# Patient Record
Sex: MALE | Race: WHITE | NOT HISPANIC OR LATINO | Employment: FULL TIME | URBAN - METROPOLITAN AREA
[De-identification: names, ages, dates, MRNs, and addresses within clinical notes are randomized per-mention and may not be internally consistent; named-entity substitution may affect disease eponyms.]

---

## 2019-12-09 ENCOUNTER — TRANSCRIBE ORDERS (OUTPATIENT)
Dept: ADMINISTRATIVE | Facility: HOSPITAL | Age: 54
End: 2019-12-09

## 2019-12-09 DIAGNOSIS — N45.1 EPIDIDYMITIS WITHOUT ABSCESS: Primary | ICD-10-CM

## 2019-12-10 ENCOUNTER — HOSPITAL ENCOUNTER (OUTPATIENT)
Dept: RADIOLOGY | Facility: HOSPITAL | Age: 54
Discharge: HOME | End: 2019-12-10
Attending: UROLOGY
Payer: COMMERCIAL

## 2019-12-10 DIAGNOSIS — N45.1 EPIDIDYMITIS WITHOUT ABSCESS: ICD-10-CM

## 2019-12-10 PROCEDURE — 76870 US EXAM SCROTUM: CPT

## 2020-03-02 ENCOUNTER — OCCMED (OUTPATIENT)
Dept: URGENT CARE | Facility: CLINIC | Age: 55
End: 2020-03-02
Payer: OTHER MISCELLANEOUS

## 2020-03-02 ENCOUNTER — APPOINTMENT (OUTPATIENT)
Dept: RADIOLOGY | Facility: CLINIC | Age: 55
End: 2020-03-02
Payer: OTHER MISCELLANEOUS

## 2020-03-02 DIAGNOSIS — S99.912A INJURY OF LEFT ANKLE, INITIAL ENCOUNTER: Primary | ICD-10-CM

## 2020-03-02 DIAGNOSIS — S99.912A INJURY OF LEFT ANKLE, INITIAL ENCOUNTER: ICD-10-CM

## 2020-03-02 PROCEDURE — G0382 LEV 3 HOSP TYPE B ED VISIT: HCPCS | Performed by: NURSE PRACTITIONER

## 2020-03-02 PROCEDURE — 99283 EMERGENCY DEPT VISIT LOW MDM: CPT | Performed by: NURSE PRACTITIONER

## 2020-03-02 PROCEDURE — 73610 X-RAY EXAM OF ANKLE: CPT

## 2020-03-04 ENCOUNTER — OCCMED (OUTPATIENT)
Dept: URGENT CARE | Facility: CLINIC | Age: 55
End: 2020-03-04
Payer: OTHER MISCELLANEOUS

## 2020-03-04 DIAGNOSIS — Z02.6 ENCOUNTER RELATED TO WORKER'S COMPENSATION CLAIM: Primary | ICD-10-CM

## 2020-03-04 PROCEDURE — 99213 OFFICE O/P EST LOW 20 MIN: CPT | Performed by: PHYSICIAN ASSISTANT

## 2020-07-28 DIAGNOSIS — S42.451A CLOSED FRACTURE OF CAPITELLUM OF DISTAL HUMERUS, RIGHT, INITIAL ENCOUNTER: Primary | ICD-10-CM

## 2020-07-28 NOTE — TELEPHONE ENCOUNTER
Same appt is fine, but i'm putting an order for a CT scan which he should get before his appointment with me on Thursday  Please coordinate with the patient to have this done stat

## 2020-07-28 NOTE — TELEPHONE ENCOUNTER
Patient dropped of disc to the office and is now uploaded in [address]  He would like you to review it and see if he should come in earlier than his Wednesday appt  Thank you

## 2020-07-28 NOTE — TELEPHONE ENCOUNTER
I scheduled the patient for the CT scan Wednesday, July 29, 2020 at 8:00 am at [facility]   I called to let him know as well and confirmed appt for Thursday

## 2020-07-29 ENCOUNTER — HOSPITAL ENCOUNTER (OUTPATIENT)
Dept: RADIOLOGY | Facility: HOSPITAL | Age: 55
Discharge: HOME | End: 2020-07-29
Attending: ORTHOPAEDIC SURGERY
Payer: COMMERCIAL

## 2020-07-29 ENCOUNTER — TRANSCRIBE ORDERS (OUTPATIENT)
Dept: RADIOLOGY | Facility: HOSPITAL | Age: 55
End: 2020-07-29

## 2020-07-29 DIAGNOSIS — S42.451A CLOSED FRACTURE OF CAPITELLUM OF DISTAL HUMERUS, RIGHT, INITIAL ENCOUNTER: ICD-10-CM

## 2020-07-29 PROCEDURE — 73200 CT UPPER EXTREMITY W/O DYE: CPT

## 2020-07-30 ENCOUNTER — OFFICE VISIT (OUTPATIENT)
Dept: OBGYN CLINIC | Facility: MEDICAL CENTER | Age: 55
End: 2020-07-30
Payer: COMMERCIAL

## 2020-07-30 ENCOUNTER — APPOINTMENT (OUTPATIENT)
Dept: RADIOLOGY | Facility: MEDICAL CENTER | Age: 55
End: 2020-07-30
Payer: COMMERCIAL

## 2020-07-30 ENCOUNTER — OFFICE VISIT (OUTPATIENT)
Dept: PHYSICAL THERAPY | Facility: MEDICAL CENTER | Age: 55
End: 2020-07-30
Payer: COMMERCIAL

## 2020-07-30 VITALS
HEIGHT: 72 IN | DIASTOLIC BLOOD PRESSURE: 76 MMHG | WEIGHT: 285 LBS | HEART RATE: 71 BPM | BODY MASS INDEX: 38.6 KG/M2 | SYSTOLIC BLOOD PRESSURE: 120 MMHG

## 2020-07-30 DIAGNOSIS — S42.451A CLOSED FRACTURE OF CAPITELLUM OF DISTAL HUMERUS, RIGHT, INITIAL ENCOUNTER: ICD-10-CM

## 2020-07-30 DIAGNOSIS — S42.451A CLOSED FRACTURE OF CAPITELLUM OF DISTAL HUMERUS, RIGHT, INITIAL ENCOUNTER: Primary | ICD-10-CM

## 2020-07-30 PROCEDURE — 73080 X-RAY EXAM OF ELBOW: CPT

## 2020-07-30 PROCEDURE — 99204 OFFICE O/P NEW MOD 45 MIN: CPT | Performed by: ORTHOPAEDIC SURGERY

## 2020-07-30 PROCEDURE — L3763 EWHO RIGID W/O JNTS CF: HCPCS | Performed by: PHYSICAL THERAPIST
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
## 2020-07-30 NOTE — PROGRESS NOTES
Orthosis    Diagnosis:   1  Closed fracture of capitellum of distal humerus, right, initial encounter  Ambulatory referral to PT/OT hand therapy     Indication: Fracture    Location: Right  elbow and wrist  Supplies: Custom Fit Orthotic and Skin coverage   Orthosis type: posterior long arm splint  Wearing Schedule: Remove for hygiene only  Describe Position: Elbow flexion to 90 degrees, splint extends from upper arm to wrist with wrist and forearm in neutral     Precautions: Fracture and Universal (skin contact/breakdown)    Patient or Caregiver expresses understanding of wearing Schedule and Precautions? Yes  Patient or Caregiver able to don/doff orthotic independently? Yes    Written orders provided to patient?  Yes  Orders Obtained: Written  Orders Obtained from: Dr Roshan Connor     Return for evaluation and treatment No

## 2020-07-30 NOTE — LETTER
July 30, 2020     Patient: Tristen Peres   YOB: 1965   Date of Visit: 7/30/2020       To Whom it May Concern:    Tristen Peres is under my professional care  He was seen in my office on 7/30/2020  He is to remain out of work until further notice  If you have any questions or concerns, please don't hesitate to call           Sincerely,          Elisabeth Wells MD        CC: No Recipients

## 2020-08-17 ENCOUNTER — APPOINTMENT (OUTPATIENT)
Dept: RADIOLOGY | Facility: MEDICAL CENTER | Age: 55
End: 2020-08-17
Payer: COMMERCIAL

## 2020-08-17 ENCOUNTER — OFFICE VISIT (OUTPATIENT)
Dept: OBGYN CLINIC | Facility: MEDICAL CENTER | Age: 55
End: 2020-08-17
Payer: COMMERCIAL

## 2020-08-17 VITALS
SYSTOLIC BLOOD PRESSURE: 114 MMHG | DIASTOLIC BLOOD PRESSURE: 82 MMHG | WEIGHT: 232 LBS | HEIGHT: 73 IN | TEMPERATURE: 98.2 F | HEART RATE: 80 BPM | BODY MASS INDEX: 30.75 KG/M2

## 2020-08-17 DIAGNOSIS — S42.451D: Primary | ICD-10-CM

## 2020-08-17 DIAGNOSIS — S42.451D: ICD-10-CM

## 2020-08-17 DIAGNOSIS — S52.121A CLOSED DISPLACED FRACTURE OF HEAD OF RIGHT RADIUS, INITIAL ENCOUNTER: ICD-10-CM

## 2020-08-17 PROCEDURE — 24650 CLTX RDL HEAD/NCK FX WO MNPJ: CPT | Performed by: ORTHOPAEDIC SURGERY

## 2020-08-17 PROCEDURE — 99213 OFFICE O/P EST LOW 20 MIN: CPT | Performed by: ORTHOPAEDIC SURGERY

## 2020-08-17 PROCEDURE — 73080 X-RAY EXAM OF ELBOW: CPT

## 2020-08-17 PROCEDURE — 24576 CLTX HUMRL CNDYLR FX WO MNPJ: CPT | Performed by: ORTHOPAEDIC SURGERY

## 2020-08-17 NOTE — PROGRESS NOTES
Chief Complaint     Right elbow pain      History of Present Illness     Michel Silva is a 50 y o  male who presents with continued right elbow pain  Notes the pain is improving  Has no numbness and tingling  Does note that he does not full strength  Has been fairly compliant in terms of nonweightbearing  Wearing his splint most of the time  Did uses his riding mower around his [address] [place] in the [facility] and notes that he bumped the elbow 1 time going over a bump and that was painful  No catching clicking popping locking        History reviewed  No pertinent past medical history  History reviewed  No pertinent surgical history  Allergies   Allergen Reactions    Bee Venom Anaphylaxis    Penicillins Rash       No current outpatient medications on file prior to visit  No current facility-administered medications on file prior to visit  Social History     Tobacco Use    Smoking status: Never Smoker    Smokeless tobacco: Never Used   Substance Use Topics    Alcohol use: No    Drug use: No       History reviewed  No pertinent family history  Review of Systems     As stated in the HPI  All other systems were reviewed and are negative  Physical Exam     /82   Pulse 80   Temp 98 2 °F (36 8 °C)   Ht 6' 1" (1 854 m)   Wt 105 kg (232 lb)   BMI 30 61 kg/m²     GENERAL: This is a well-developed, well-nourished, age-appropriate patient in no acute distress  The patient is alert and oriented x3  Pleasant and cooperative  Eyes: Anicteric sclerae  Extraocular movements appear intact  HENT: Nares are patent with no drainage  Lungs: There is equal chest rise on inspection  Breathing is non-labored with no audible wheezing  Cardiovascular: No cyanosis  No upper extremity lymphadema  Skin: Skin is warm to touch  No obvious skin lesions or rashes other than described below  Neurologic: No ataxia  Psychiatric: Mood and affect are appropriate      Sup 80  Pro 80  Flex/ext   TTP radial head and minimally over lateral column  5/5 Motor to the APB, FDI, FDP2, FDP5, EDC  Sensation intact to light touch in the median, radial, and ulnar nerve distribution  Minimal swelling about the elbow      Data Review     Results Reviewed     None             Imaging:  Multiple view elbows x-rays of the right elbow taken in the office and personally reviewed by me today shows evidence of stable alignment of the capitellar shear fracture with no motion in flexion versus extension and further visualization of the radial head fracture as well  Assessment and Plan      Diagnoses and all orders for this visit:    Closed fracture of capitellum of distal humerus, right, with routine healing, subsequent encounter  -     XR elbow 3+ vw right; Future             45-year-old male with a capitellar shear fracture that is minimally displaced as well as a minimally displaced radial head fracture on the ipsilateral side  Patient is doing very well with minimal pain and improving range of motion  We discussed continuing range of motion exercise but no weight-bearing  He will use a splint intermittently between exercises which he should complete 3 times a day  He will remain out of work at this time  We will continue to treat nonoperatively given his excellent outcome so far  Follow Up:  1 month    To Do Next Visit:  Repeat three-view x-rays right elbow  Advance weight-bearing    PROCEDURES PERFORMED:  Fracture / Dislocation Treatment    Date/Time: 8/17/2020 9:12 AM  Performed by: Pankaj Chappell MD  Authorized by: Pankaj Chappell MD     Injury location:  Elbow  Location details:  Right elbow  Injury type:  Fracture  Fracture type: radial head    Fracture type: radial head    Manipulation performed?: No    Fracture / Dislocation Treatment    Date/Time: 8/17/2020 9:13 AM  Performed by: Pankaj Chappell MD  Authorized by:  Pankaj Chappell MD     Injury location:  Upper arm  Location details:  Right upper arm  Injury type:  Fracture  Fracture type: lateral condylar    Manipulation performed?: No

## 2020-08-17 NOTE — LETTER
August 17, 2020     Patient: Mati Craroll   YOB: 1965   Date of Visit: 8/17/2020       To Whom it May Concern:    Mati Carroll is under my professional care  He was seen in my office on 8/17/2020  He is to remain out of work  He will be reassessed in 1 month  If you have any questions or concerns, please don't hesitate to call           Sincerely,          Nusrat Mcclendon MD        CC: No Recipients

## 2020-09-15 ENCOUNTER — OFFICE VISIT (OUTPATIENT)
Dept: OBGYN CLINIC | Facility: MEDICAL CENTER | Age: 55
End: 2020-09-15

## 2020-09-15 ENCOUNTER — APPOINTMENT (OUTPATIENT)
Dept: RADIOLOGY | Facility: MEDICAL CENTER | Age: 55
End: 2020-09-15
Payer: COMMERCIAL

## 2020-09-15 VITALS
BODY MASS INDEX: 36.05 KG/M2 | DIASTOLIC BLOOD PRESSURE: 80 MMHG | TEMPERATURE: 99.1 F | WEIGHT: 272 LBS | HEIGHT: 73 IN | SYSTOLIC BLOOD PRESSURE: 138 MMHG | HEART RATE: 68 BPM

## 2020-09-15 DIAGNOSIS — S42.451D: ICD-10-CM

## 2020-09-15 DIAGNOSIS — S42.451D: Primary | ICD-10-CM

## 2020-09-15 PROCEDURE — 99024 POSTOP FOLLOW-UP VISIT: CPT | Performed by: ORTHOPAEDIC SURGERY

## 2020-09-15 PROCEDURE — 73080 X-RAY EXAM OF ELBOW: CPT

## 2020-10-13 ENCOUNTER — OFFICE VISIT (OUTPATIENT)
Dept: OBGYN CLINIC | Facility: MEDICAL CENTER | Age: 55
End: 2020-10-13

## 2020-10-13 ENCOUNTER — APPOINTMENT (OUTPATIENT)
Dept: RADIOLOGY | Facility: MEDICAL CENTER | Age: 55
End: 2020-10-13
Payer: COMMERCIAL

## 2020-10-13 VITALS
SYSTOLIC BLOOD PRESSURE: 133 MMHG | HEIGHT: 72 IN | BODY MASS INDEX: 37.93 KG/M2 | TEMPERATURE: 99.1 F | HEART RATE: 82 BPM | DIASTOLIC BLOOD PRESSURE: 88 MMHG | WEIGHT: 280 LBS

## 2020-10-13 DIAGNOSIS — S42.451D: Primary | ICD-10-CM

## 2020-10-13 DIAGNOSIS — S42.451D: ICD-10-CM

## 2020-10-13 PROCEDURE — 99024 POSTOP FOLLOW-UP VISIT: CPT | Performed by: ORTHOPAEDIC SURGERY

## 2020-10-13 PROCEDURE — 73080 X-RAY EXAM OF ELBOW: CPT

## 2020-11-25 ENCOUNTER — OFFICE VISIT (OUTPATIENT)
Dept: OBGYN CLINIC | Facility: MEDICAL CENTER | Age: 55
End: 2020-11-25
Payer: COMMERCIAL

## 2020-11-25 VITALS
DIASTOLIC BLOOD PRESSURE: 84 MMHG | SYSTOLIC BLOOD PRESSURE: 130 MMHG | TEMPERATURE: 98.1 F | HEIGHT: 72 IN | HEART RATE: 78 BPM | BODY MASS INDEX: 37.97 KG/M2

## 2020-11-25 DIAGNOSIS — G56.21 CUBITAL TUNNEL SYNDROME ON RIGHT: Primary | ICD-10-CM

## 2020-11-25 DIAGNOSIS — S42.451D: ICD-10-CM

## 2020-11-25 DIAGNOSIS — S52.121D CLOSED DISPLACED FRACTURE OF HEAD OF RIGHT RADIUS WITH ROUTINE HEALING, SUBSEQUENT ENCOUNTER: ICD-10-CM

## 2020-11-25 PROCEDURE — 99213 OFFICE O/P EST LOW 20 MIN: CPT | Performed by: ORTHOPAEDIC SURGERY

## 2021-03-17 ENCOUNTER — OFFICE VISIT (OUTPATIENT)
Dept: OBGYN CLINIC | Facility: MEDICAL CENTER | Age: 56
End: 2021-03-17
Payer: COMMERCIAL

## 2021-03-17 VITALS
SYSTOLIC BLOOD PRESSURE: 132 MMHG | BODY MASS INDEX: 37.93 KG/M2 | HEART RATE: 79 BPM | DIASTOLIC BLOOD PRESSURE: 86 MMHG | HEIGHT: 72 IN | TEMPERATURE: 98.6 F | WEIGHT: 280 LBS

## 2021-03-17 DIAGNOSIS — S42.451D: ICD-10-CM

## 2021-03-17 DIAGNOSIS — G56.21 CUBITAL TUNNEL SYNDROME ON RIGHT: Primary | ICD-10-CM

## 2021-03-17 PROCEDURE — 99213 OFFICE O/P EST LOW 20 MIN: CPT | Performed by: ORTHOPAEDIC SURGERY

## 2021-03-30 DIAGNOSIS — Z23 ENCOUNTER FOR IMMUNIZATION: ICD-10-CM

## 2021-10-18 ENCOUNTER — OFFICE VISIT (OUTPATIENT)
Dept: DERMATOLOGY | Facility: CLINIC | Age: 56
End: 2021-10-18
Payer: COMMERCIAL

## 2021-10-18 VITALS — HEIGHT: 73 IN | TEMPERATURE: 97.2 F | WEIGHT: 294 LBS | BODY MASS INDEX: 38.97 KG/M2

## 2021-10-18 DIAGNOSIS — L82.1 SEBORRHEIC KERATOSES: ICD-10-CM

## 2021-10-18 DIAGNOSIS — L70.0 NODULAR ELASTOSIS WITH CYSTS AND COMEDONES OF FAVRE AND RACOUCHOT: ICD-10-CM

## 2021-10-18 DIAGNOSIS — L57.0 ACTINIC KERATOSES: Primary | ICD-10-CM

## 2021-10-18 DIAGNOSIS — B36.0 TINEA VERSICOLOR: ICD-10-CM

## 2021-10-18 DIAGNOSIS — L57.8 NODULAR ELASTOSIS WITH CYSTS AND COMEDONES OF FAVRE AND RACOUCHOT: ICD-10-CM

## 2021-10-18 PROCEDURE — 17003 DESTRUCT PREMALG LES 2-14: CPT | Performed by: DERMATOLOGY

## 2021-10-18 PROCEDURE — 17000 DESTRUCT PREMALG LESION: CPT | Performed by: DERMATOLOGY

## 2021-10-18 PROCEDURE — 99203 OFFICE O/P NEW LOW 30 MIN: CPT | Performed by: DERMATOLOGY

## 2021-10-18 RX ORDER — KETOCONAZOLE 20 MG/ML
SHAMPOO TOPICAL
Qty: 120 ML | Refills: 6 | Status: SHIPPED | OUTPATIENT
Start: 2021-10-18

## 2024-02-05 ENCOUNTER — NEW PATIENT COMPREHENSIVE (OUTPATIENT)
Dept: URBAN - METROPOLITAN AREA CLINIC 6 | Facility: CLINIC | Age: 59
End: 2024-02-05

## 2024-02-05 DIAGNOSIS — Z79.4: ICD-10-CM

## 2024-02-05 DIAGNOSIS — E11.9: ICD-10-CM

## 2024-02-05 PROCEDURE — 92004 COMPRE OPH EXAM NEW PT 1/>: CPT

## 2024-02-05 ASSESSMENT — TONOMETRY
OD_IOP_MMHG: 19
OS_IOP_MMHG: 18

## 2024-02-05 ASSESSMENT — VISUAL ACUITY
OS_SC: 20/20
OD_SC: 20/20
OU_SC: J5

## 2024-02-22 ENCOUNTER — OFFICE VISIT (OUTPATIENT)
Dept: DERMATOLOGY | Facility: CLINIC | Age: 59
End: 2024-02-22
Payer: COMMERCIAL

## 2024-02-22 VITALS — TEMPERATURE: 98.3 F | BODY MASS INDEX: 35.52 KG/M2 | WEIGHT: 268 LBS | HEIGHT: 73 IN

## 2024-02-22 DIAGNOSIS — L71.9 ROSACEA: Primary | ICD-10-CM

## 2024-02-22 PROCEDURE — 99213 OFFICE O/P EST LOW 20 MIN: CPT | Performed by: STUDENT IN AN ORGANIZED HEALTH CARE EDUCATION/TRAINING PROGRAM

## 2024-02-22 RX ORDER — BLOOD SUGAR DIAGNOSTIC
STRIP MISCELLANEOUS
COMMUNITY
Start: 2024-02-13

## 2024-02-22 RX ORDER — BLOOD-GLUCOSE METER
KIT MISCELLANEOUS
COMMUNITY
Start: 2024-01-03

## 2024-02-22 RX ORDER — INSULIN LISPRO 200 [IU]/ML
8 INJECTION, SOLUTION SUBCUTANEOUS
COMMUNITY
Start: 2024-01-09

## 2024-02-22 RX ORDER — LANCETS 28 GAUGE
EACH MISCELLANEOUS
COMMUNITY
Start: 2024-02-13

## 2024-02-22 RX ORDER — INSULIN GLARGINE-YFGN 100 [IU]/ML
35 INJECTION, SOLUTION SUBCUTANEOUS
COMMUNITY
Start: 2024-01-09

## 2024-02-22 RX ORDER — PEN NEEDLE, DIABETIC 31 GX5/16"
NEEDLE, DISPOSABLE MISCELLANEOUS
COMMUNITY
Start: 2024-02-13

## 2024-02-22 NOTE — PROGRESS NOTES
"[facility] Dermatology Clinic Note     Patient Name: Yehuda Pham  Encounter Date: 2/22/24     Have you been cared for by a [facility] Dermatologist in the last 3 years and, if so, which description applies to you?    YES.  I HAVE been seen here within the last 3 years, and my medical history HAS changed.   I am MALE/not capable of bearing children    REVIEW OF SYSTEMS:  Have you recently had or currently have any of the following? Recent fever or chills? No  Any non-healing wound? No   PAST MEDICAL HISTORY:  Have you personally ever had or currently have any of the following?  If \"YES,\" then please provide more detail. Skin cancer (such as Melanoma, Basal Cell Carcinoma, Squamous Cell Carcinoma?  No  Tuberculosis, HIV/AIDS, Hepatitis B or C: No  Radiation Treatment No   HISTORY OF IMMUNOSUPPRESSION:   Do you have a history of any of the following:  Systemic Immunosuppression such as Diabetes, Biologic or Immunotherapy, Chemotherapy, Organ Transplantation, Bone Marrow Transplantation?  YES, diabetes     Answering \"YES\" requires the addition of the dotphrase \"IMMUNOSUPPRESSED\" as the first diagnosis of the patient's visit.   FAMILY HISTORY:  Any \"first degree relatives\" (parent, brother, sister, or child) with the following?    Skin Cancer, Pancreatic or Other Cancer? No   PATIENT EXPERIENCE:    Do you want the Dermatologist to perform a COMPLETE skin exam today including a clinical examination under the \"bra and underwear\" areas?  NO  If necessary, do we have your permission to call and leave a detailed message on your Preferred Phone number that includes your specific medical information?  Yes      Allergies   Allergen Reactions    Bee Venom Anaphylaxis    Penicillins Rash      Current Outpatient Medications:     B-D ULTRAFINE III SHORT PEN 31G X 8 MM MISC, , Disp: , Rfl:     Blood Glucose Monitoring Suppl (FreeStyle Lite) w/Device KIT, , Disp: , Rfl:     FreeStyle InsuLinx Test test strip, , Disp: , Rfl:     " "glucose blood test strip, Use, Disp: , Rfl:     HumaLOG KwikPen 200 units/mL CONCENTRATED U-200 injection pen, Inject 8 Units under the skin, Disp: , Rfl:     Insulin Glargine-yfgn 100 UNIT/ML SOPN, Inject 35 Units under the skin, Disp: , Rfl:     Lancets (freestyle) lancets, , Disp: , Rfl:     ketoconazole (NIZORAL) 2 % shampoo, Daily for 2 weeks straight and then on \"Mondays, Wednesdays and Fridays\":  Lather into back; leave on for 5 minutes and then rinse off completely. (Patient not taking: Reported on 2/22/2024), Disp: 120 mL, Rfl: 6          Whom besides the patient is providing clinical information about today's encounter?   NO ADDITIONAL HISTORIAN (patient alone provided history)    Physical Exam and Assessment/Plan by Diagnosis:      ROSACEA    Physical Exam:  Anatomic Location Affected:  face  Morphological Description:  papules and pustules on telangiectatic patches   Pertinent Positives:  Pertinent Negatives:    Additional History of Present Condition:  noticed flared last summer, recently diagnose with Diabetes was using preparation H    Assessment and Plan:  Based on a thorough discussion of this condition and the management approach to it (including a comprehensive discussion of the known risks, side effects and potential benefits of treatment), the patient (family) agrees to implement the following specific plan:    Rosacea Triple Cream AZELAIC 15%/ IVERMECTIN 1%/ METRONIDAZOLE 1% (TRIPLE ROSACEA) CREAM. Medication will go to [facility] in [facility]. Please call the pharmacy if you did not hear from them in 3 days. [phone].  Please discontinue Preparation H cream.    Rosacea is a chronic rash affecting the mid-face including the nose, cheeks, chin, forehead, and eyelids. The incidence is usually greatest between the ages of 30-60 years and is more common in people with fair skin. Common characteristics include redness, telangiectasias, papules and pustules over affected areas. " Rosacea may look similar to acne, but there is a lack of comedones. Occasionally the eyes may also be involved in ocular rosacea. In advanced disease, enlargement of the sebaceous glands in the nose, termed rhinophyma, may be present.     Rosacea results in red spots (papules) and sometimes pustules over the face, but unlike acne there are no blackheads, whiteheads, or cystic nodules. Patients often experience increased facial flushing with prominent blood vessels (erythematotelangiectatic rosacea) and dry, sensitive skin. These symptoms are exacerbated by sun exposure, hot or spicy foods, topical steroids and oil-based facial products.     In ocular rosacea, eyelids may be red and sore due to conjunctivitis, keratitis, and episcleritis. If rhinophyma develops due to enlargement of sebaceous glands, the patient may have an enlarged and irregularly shaped nose with prominent pores. In rosacea that is refractory to treatment, patients can develop persistent redness and swelling of the face due to lymphatic obstruction (Morbihan disease).     Distribution around the cheeks may be confused with the malar or “butterfly rash” of lupus. However, the rash of lupus spares the nasal creases and lacks papules and pustules. If signs of photosensitivity, oral ulcers, arthritis, and kidney dysfunction are present then consider referral to a rheumatologist.     There are many potential causes of rosacea including genetic, environmental, vascular, and inflammatory factors. These include, but are not limited to:  Chronic exposure to ultraviolet radiation   Increased immune responses in the form of cathelicidins that promote vessel dilation and infiltration with white blood cells (neutrophils) into the dermis  Increased matrix metalloproteinases such as collagen and elastase that remodel normal tissue may contribute to inflammation of the skin making it thicker and harder  There is some evidence to suggest that increased numbers of  demodex mites on patient skin may contribute to rosacea papules     General Treatment Approach   Avoid exacerbating factors such as heat, spicy foods, and alcohol   Use daily SPF30+ sunscreen and other methods of coverage for sun protection  Use water-based make-up   Avoid applying topical steroids to affected areas as they can cause perioral dermatitis and exacerbate rosacea     Topical Treatment Approach  Metronidazole cream or gel by itself or in combination with oral antibiotics for more severe cases  Azelaic acid cream or lotion is effective for mild inflammatory rosacea when applied twice daily to affected areas  Brimonidine gel and oxymetazoline hydrochloride cream can reduce facial redness temporarily   Ivermectin cream can treat papulopustular rosacea by controlling demodex mites and inflammation   Pimecrolimus cream or tacrolimus ointment twice a day for 2-3 months can help reduce inflammation    Oral Treatment Approach  Antibiotics such as doxycycline, minocycline, or erythromycin for 1-3 months  Clonidine and carvedilol can help reduce facial flushing and are generally well tolerated. Common side effects include low blood pressure, gastrointestinal upset, dry eyes, blurred vision and low heart rate.   Isotretinoin at low doses can be effective for long term treatment when antibiotics fail. Side effects may make it unsuitable for some patients.   NSAIDs such as diclofenac can help reduce discomfort and redness in the skin.     Procedural/Surgical Treatment Approach   Vascular lasers or intense pulsed light treatment may be used to treat persistent telangiectasia and papulopustular rosacea  Plastic surgery and carbon dioxide lasers may be used to treat rhinophyma      Scribe Attestation      I,:  Leticia Pardo am acting as a scribe while in the presence of the attending physician.:       I,:  Raoul Farmer MD personally performed the services described in this documentation    as scribed in my  presence.:

## 2024-02-22 NOTE — PATIENT INSTRUCTIONS
.ROSACEA    Assessment and Plan:  Based on a thorough discussion of this condition and the management approach to it (including a comprehensive discussion of the known risks, side effects and potential benefits of treatment), the patient (family) agrees to implement the following specific plan:    Medication will go to [facility] in [facility]. Please call the pharmacy if you did not hear from them in 3 days. [phone].  Please discontinue Preparation H cream.    Rosacea is a chronic rash affecting the mid-face including the nose, cheeks, chin, forehead, and eyelids. The incidence is usually greatest between the ages of 30-60 years and is more common in people with fair skin. Common characteristics include redness, telangiectasias, papules and pustules over affected areas. Rosacea may look similar to acne, but there is a lack of comedones. Occasionally the eyes may also be involved in ocular rosacea. In advanced disease, enlargement of the sebaceous glands in the nose, termed rhinophyma, may be present.     Rosacea results in red spots (papules) and sometimes pustules over the face, but unlike acne there are no blackheads, whiteheads, or cystic nodules. Patients often experience increased facial flushing with prominent blood vessels (erythematotelangiectatic rosacea) and dry, sensitive skin. These symptoms are exacerbated by sun exposure, hot or spicy foods, topical steroids and oil-based facial products.     In ocular rosacea, eyelids may be red and sore due to conjunctivitis, keratitis, and episcleritis. If rhinophyma develops due to enlargement of sebaceous glands, the patient may have an enlarged and irregularly shaped nose with prominent pores. In rosacea that is refractory to treatment, patients can develop persistent redness and swelling of the face due to lymphatic obstruction (Morbihan disease).     Distribution around the cheeks may be confused with the malar or “butterfly rash” of  lupus. However, the rash of lupus spares the nasal creases and lacks papules and pustules. If signs of photosensitivity, oral ulcers, arthritis, and kidney dysfunction are present then consider referral to a rheumatologist.     There are many potential causes of rosacea including genetic, environmental, vascular, and inflammatory factors. These include, but are not limited to:  Chronic exposure to ultraviolet radiation   Increased immune responses in the form of cathelicidins that promote vessel dilation and infiltration with white blood cells (neutrophils) into the dermis  Increased matrix metalloproteinases such as collagen and elastase that remodel normal tissue may contribute to inflammation of the skin making it thicker and harder  There is some evidence to suggest that increased numbers of demodex mites on patient skin may contribute to rosacea papules     General Treatment Approach   Avoid exacerbating factors such as heat, spicy foods, and alcohol   Use daily SPF30+ sunscreen and other methods of coverage for sun protection  Use water-based make-up   Avoid applying topical steroids to affected areas as they can cause perioral dermatitis and exacerbate rosacea     Topical Treatment Approach  Metronidazole cream or gel by itself or in combination with oral antibiotics for more severe cases  Azelaic acid cream or lotion is effective for mild inflammatory rosacea when applied twice daily to affected areas  Brimonidine gel and oxymetazoline hydrochloride cream can reduce facial redness temporarily   Ivermectin cream can treat papulopustular rosacea by controlling demodex mites and inflammation   Pimecrolimus cream or tacrolimus ointment twice a day for 2-3 months can help reduce inflammation    Oral Treatment Approach  Antibiotics such as doxycycline, minocycline, or erythromycin for 1-3 months  Clonidine and carvedilol can help reduce facial flushing and are generally well tolerated. Common side effects include  low blood pressure, gastrointestinal upset, dry eyes, blurred vision and low heart rate.   Isotretinoin at low doses can be effective for long term treatment when antibiotics fail. Side effects may make it unsuitable for some patients.   NSAIDs such as diclofenac can help reduce discomfort and redness in the skin.     Procedural/Surgical Treatment Approach   Vascular lasers or intense pulsed light treatment may be used to treat persistent telangiectasia and papulopustular rosacea  Plastic surgery and carbon dioxide lasers may be used to treat rhinophyma

## 2024-03-11 NOTE — PROGRESS NOTES
Chief Complaint     Intermittent right elbow pain/numbness to ring and small finger     History of Present Illness     Amish Hernandez is a 49 y o  right hand dominant male who presents to the office today for a follow up evaluation of his right elbow  Patient sustained capitellar shear fx and radial head fx on 7/25/2020  Patient is also following up for right cubital tunnel syndrome  Patient states since his last visit he has continued to experience intermittent right elbow pain  Patient states this is worst activities  He denies any significant pain at rest today  He reports when he did have shovel over the winter had increased soreness about the elbow  He has been working on his range of motion at home  He does state with motion sometimes he feels a clicking sensation  Patient states he is concerned that increased activity will further complicate his healing process  He continues to note intermittent numbness to the ring and small finger on the right side specifically when he is leaning on the elbow his arm chair or when he is reading a book  He has tried towel splinting at night but states he was not successful with this as his dog pulls the towel off  Patient denies any new injuries  Previous to his injury he did work as a   He states he is not back to work, however this is not related to his elbow injury  No past medical history on file  No past surgical history on file  Allergies   Allergen Reactions    Bee Venom Anaphylaxis    Penicillins Rash       No current outpatient medications on file prior to visit  No current facility-administered medications on file prior to visit  Social History     Tobacco Use    Smoking status: Never Smoker    Smokeless tobacco: Never Used   Substance Use Topics    Alcohol use: No    Drug use: No       No family history on file  Review of Systems     As stated in the HPI   All other systems were reviewed and are Pocket formed. negative  Physical Exam     /86   Pulse 79   Temp 98 6 °F (37 °C)   Ht 6' (1 829 m)   Wt 127 kg (280 lb)   BMI 37 97 kg/m²     GENERAL: This is a well-developed, well-nourished, age-appropriate patient in no acute distress  The patient is alert and oriented x3  Pleasant and cooperative  Eyes: Anicteric sclerae  Extraocular movements appear intact  HENT: Nares are patent with no drainage  Lungs: There is equal chest rise on inspection  Breathing is non-labored with no audible wheezing  Cardiovascular: No cyanosis  No upper extremity lymphadema  Skin: Skin is warm to touch  No obvious skin lesions or rashes other than described below  Neurologic: No ataxia  Psychiatric: Mood and affect are appropriate  Right upper extremity   Near full extension and flexion equal to contralateral side   Pronation equal to contralateral side, 5 degrees more of supination in comparison to contralateral side   DPC 0  No crepitation with motion in elbow flexion and extension and forearm supination and pronation   Tender at radiocapitellar joint   Ulnar nerve is subluxating bilaterally   Negative tinel's at cubital and carpal tunnel   Negative flexion compression test of the elbow   Negative Durkan's compression test   5/5 Motor to the APB, FDI, FDP2, FDP5, EDC  Sensation intact to light touch in the median, radial, and ulnar nerve distribution  Brisk capillary refill noted to all digits     Data Review     Results Reviewed     None             Imaging:  None today     Assessment and Plan      Diagnoses and all orders for this visit:    Cubital tunnel syndrome on right  -     US MSK limited; Future    Closed fracture of capitellum of distal humerus, right, with routine healing, subsequent encounter            53-year-old male with right elbow capitellum and radial head fracture with right cubital tunnel syndrome    Patient and I had based exam today not sure that cubital is all of his symptomatology he does subluxation of the ulnar nerve on exam would like to further evaluate the ulnar nerve as well as possibility of the ulnar nerve on ultrasound  Will obtain ultrasound bilaterally for comparison purposes  I did recommend to him that he use a volleyball knee pad or a shin guard for splinting of his elbow as he is unable to use the towel  We did discuss surgical intervention may be warranted in the future of a cubital tunnel release with possible transposition based on his ultrasound findings  I will see him back after ultrasound for further discussion of his treatment    Follow Up:   After ultrasound    To Do Next Visit:  Review study, possible surgical discussion     PROCEDURES PERFORMED:  Procedures  No Procedures performed today    Scribe Attestation    I,:  Jaci Rene MA am acting as a scribe while in the presence of the attending physician :       I,:  Shannon Levy MD personally performed the services described in this documentation    as scribed in my presence :

## 2024-09-15 ENCOUNTER — HOSPITAL ENCOUNTER (EMERGENCY)
Facility: HOSPITAL | Age: 59
Discharge: HOME | End: 2024-09-15
Attending: EMERGENCY MEDICINE | Admitting: EMERGENCY MEDICINE
Payer: COMMERCIAL

## 2024-09-15 VITALS
RESPIRATION RATE: 16 BRPM | HEART RATE: 64 BPM | OXYGEN SATURATION: 97 % | TEMPERATURE: 98.1 F | SYSTOLIC BLOOD PRESSURE: 158 MMHG | DIASTOLIC BLOOD PRESSURE: 75 MMHG
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
## 2024-09-15 PROCEDURE — 90715 TDAP VACCINE 7 YRS/> IM: CPT

## 2024-09-15 PROCEDURE — 90471 IMMUNIZATION ADMIN: CPT

## 2024-09-15 PROCEDURE — 99284 EMERGENCY DEPT VISIT MOD MDM: CPT | Performed by: EMERGENCY MEDICINE

## 2024-09-15 PROCEDURE — 99283 EMERGENCY DEPT VISIT LOW MDM: CPT

## 2024-09-15 RX ORDER — LIDOCAINE HYDROCHLORIDE AND EPINEPHRINE 10; 10 MG/ML; UG/ML
20 INJECTION, SOLUTION INFILTRATION; PERINEURAL ONCE
Status: COMPLETED | OUTPATIENT
Start: 2024-09-15 | End: 2024-09-15

## 2024-09-15 RX ORDER — LIDOCAINE HYDROCHLORIDE AND EPINEPHRINE 10; 10 MG/ML; UG/ML
1 INJECTION, SOLUTION INFILTRATION; PERINEURAL ONCE
Status: COMPLETED | OUTPATIENT
Start: 2024-09-15 | End: 2024-09-15

## 2024-09-15 RX ORDER — DOXYCYCLINE 100 MG/1
100 CAPSULE ORAL ONCE
Status: COMPLETED | OUTPATIENT
Start: 2024-09-15 | End: 2024-09-15

## 2024-09-15 RX ORDER — METRONIDAZOLE 500 MG/1
500 TABLET ORAL EVERY 8 HOURS SCHEDULED
Qty: 30 TABLET | Refills: 0 | Status: SHIPPED | OUTPATIENT
Start: 2024-09-15 | End: 2024-09-25

## 2024-09-15 RX ORDER — METRONIDAZOLE 500 MG/1
500 TABLET ORAL ONCE
Status: COMPLETED | OUTPATIENT
Start: 2024-09-15 | End: 2024-09-15

## 2024-09-15 RX ORDER — DOXYCYCLINE 100 MG/1
100 CAPSULE ORAL 2 TIMES DAILY
Qty: 20 CAPSULE | Refills: 0 | Status: SHIPPED | OUTPATIENT
Start: 2024-09-15 | End: 2024-09-25

## 2024-09-15 RX ORDER — LIDOCAINE HYDROCHLORIDE 10 MG/ML
10 INJECTION, SOLUTION EPIDURAL; INFILTRATION; INTRACAUDAL; PERINEURAL ONCE
Status: COMPLETED | OUTPATIENT
Start: 2024-09-15 | End: 2024-09-15

## 2024-09-15 RX ADMIN — DOXYCYCLINE 100 MG: 100 CAPSULE ORAL at 08:12

## 2024-09-15 RX ADMIN — TETANUS TOXOID, REDUCED DIPHTHERIA TOXOID AND ACELLULAR PERTUSSIS VACCINE, ADSORBED 0.5 ML: 5; 2.5; 8; 8; 2.5 SUSPENSION INTRAMUSCULAR at 08:12

## 2024-09-15 RX ADMIN — METRONIDAZOLE 500 MG: 500 TABLET ORAL at 08:12

## 2024-09-15 RX ADMIN — LIDOCAINE HYDROCHLORIDE,EPINEPHRINE BITARTRATE 20 ML: 10; .01 INJECTION, SOLUTION INFILTRATION; PERINEURAL at 08:13

## 2024-09-15 RX ADMIN — LIDOCAINE HYDROCHLORIDE,EPINEPHRINE BITARTRATE 1 ML: 10; .01 INJECTION, SOLUTION INFILTRATION; PERINEURAL at 08:12

## 2024-09-15 RX ADMIN — LIDOCAINE HYDROCHLORIDE 10 ML: 10 INJECTION, SOLUTION EPIDURAL; INFILTRATION; INTRACAUDAL; PERINEURAL at 08:16

## 2024-09-15 NOTE — DISCHARGE INSTRUCTIONS
You were seen today for a dog bite to your face    Please take the antibiotics as prescribed    Follow-up with Dr. Sanchez who is an ENT and facial surgeon    Please return the emergency department if you have any swelling, redness, fever, chills, drainage from your nose, black on your nose, any other symptoms that are concerning to you

## 2024-09-15 NOTE — ED ATTENDING ATTESTATION
9/15/2024  I, Sam Mendoza MD, saw and evaluated the patient. I have discussed the patient with the resident/non-physician practitioner and agree with the resident's/non-physician practitioner's findings, Plan of Care, and MDM as documented in the resident's/non-physician practitioner's note, except where noted. All available labs and Radiology studies were reviewed.  I was present for key portions of any procedure(s) performed by the resident/non-physician practitioner and I was immediately available to provide assistance.       At this point I agree with the current assessment done in the Emergency Department.  I have conducted an independent evaluation of this patient a history and physical is as follows:  The patient was bitten by his own dog  He was giving a biscuit treat to the dog the dog snapped and bit him in the nose he has several lacerations at the bridge of his nose it also involves the alar cartilage and the anterior cartilage  No septal hematoma  ENT for repair  The patient's dog is up-to-date with immunizations  ED Course         Critical Care Time  Procedures

## 2024-09-15 NOTE — ED PROVIDER NOTES
1. Dog bite, initial encounter      ED Disposition       ED Disposition   Discharge    Condition   Stable    Date/Time   Sun Sep 15, 2024 11:44 AM    Comment   Yehuda Pham discharge to home/self care.                   Assessment & Plan       Medical Decision Making  Yehuda Pham is a 59 y.o. male who presents to the emergency department for dog bite    Based on patient's clinical history and physical exam there are no red flag signs or symptoms     I will order appropriate testing to narrow my differential    Differential diagnosis includes but is not limited to: Laceration, collagenous injury    Patient was evaluated at bedside determined due to extent of laceration, avulsion of cartilage that patient needs ENT evaluation.  ENT was consulted.  ENT requested that we copiously irrigate the wound with dilute hydrogen peroxide.  This is performed according procedure note.  Tetanus and antibiotics given in the ED.  The dog is up-to-date on vaccines and a domestic, household dog that lives with the patient.    ENT came to the department to close the patient's nose.  The nose was closed by ENT.  ENT recommended outpatient follow-up and are okay with doxycycline and Flagyl.  Patient is agreeable to follow-up as well as appropriate return precautions.      Risk  Prescription drug management.                  ED Course as of 09/15/24 1148   Sun Sep 15, 2024   1144 Spoke with ENT resident okay with antibiotics and outpatient follow-up       Medications   doxycycline hyclate (VIBRAMYCIN) capsule 100 mg (100 mg Oral Given 9/15/24 0812)   metroNIDAZOLE (FLAGYL) tablet 500 mg (500 mg Oral Given 9/15/24 0812)   tetanus-diphtheria-acellular pertussis (BOOSTRIX) IM injection 0.5 mL (0.5 mL Intramuscular Given 9/15/24 0812)   lidocaine-epinephrine (XYLOCAINE/EPINEPHRINE) 1 %-1:100,000 injection 1 mL (1 mL Infiltration Given by Other 9/15/24 0812)   lidocaine (PF) (XYLOCAINE-MPF) 1 % injection 10 mL (10 mL Infiltration Given  by Other 9/15/24 8516)   lidocaine-epinephrine (XYLOCAINE/EPINEPHRINE) 1 %-1:100,000 injection 20 mL (20 mL Infiltration Given by Other 9/15/24 6129)       History of Present Illness       Patient is a 59-year-old male with past medical history of insulin controlled diabetes A1c 5.4.  He was bit by his dog on the nose this morning.  No other complaints.  Bleeding controlled.  Last tetanus unknown.            Review of Systems   Constitutional:  Negative for chills, fatigue and fever.   Respiratory:  Negative for cough and shortness of breath.    Cardiovascular:  Negative for chest pain.   Gastrointestinal:  Negative for abdominal pain.   Neurological:  Negative for seizures and syncope.   All other systems reviewed and are negative.          Objective     ED Triage Vitals [09/15/24 0731]   Temperature Pulse Blood Pressure Respirations SpO2 Patient Position - Orthostatic VS   98.1 °F (36.7 °C) 64 158/75 16 97 % --      Temp Source Heart Rate Source BP Location FiO2 (%) Pain Score    Tympanic Monitor Right arm -- 2        Physical Exam  Constitutional:       General: He is not in acute distress.     Appearance: Normal appearance. He is normal weight. He is not ill-appearing, toxic-appearing or diaphoretic.   HENT:      Head:        Nose: Nose normal.   Eyes:      General: No scleral icterus.        Right eye: No discharge.         Left eye: No discharge.      Conjunctiva/sclera: Conjunctivae normal.      Pupils: Pupils are equal, round, and reactive to light.   Cardiovascular:      Rate and Rhythm: Normal rate.   Pulmonary:      Effort: Pulmonary effort is normal. No respiratory distress.   Skin:     General: Skin is warm and dry.      Capillary Refill: Capillary refill takes less than 2 seconds.   Neurological:      General: No focal deficit present.      Mental Status: He is alert and oriented to person, place, and time.         Labs Reviewed - No data to display  No orders to display       General  Procedure    Date/Time: 9/15/2024 8:56 AM    Performed by: Mehran Mariee MD  Authorized by: Mehran Mariee MD    Patient location:  ED  Assisting Provider(s): No    Consent:     Consent obtained:  Verbal    Consent given by:  Patient    Alternatives discussed:  No treatment  Pre-procedure details:     Procedure prep: Dilute hydrogen peroxide per recommendation from ENT.  Anesthesia (see MAR for exact dosages):     Anesthesia method:  Local infiltration    Local anesthetic:  Lidocaine 1% w/o epi  Procedure Detail:     Equipment used:  Syringe, 16-gauge Angiocath, hydroperoxide, saline    Procedure note (site, laterality, method, findings):  Patient was locally infiltrated.  Was copiously irrigated with dilute hydrogen peroxide per request from ENT.  Total wound area approximately 6 cm².  Laceration length 5 cm x 1.5 cm.  Post-procedure details:     Patient tolerance of procedure:  Tolerated well, no immediate complications           Mehran Mariee MD  09/15/24 1148

## 2024-09-15 NOTE — CONSULTS
"OTOLARYNGOLOGY CONSULT    Date of Service: 9/15/2024    Reason for consult: Dog bite     ASSESSMENT/PLAN:  Yehuda Pham is a 59 y.o. male who we are consulted on for nasal dog bite. Repaired bedside without complication.       -bacitracin to wound 4x a day  -local wound care   -antibiotics per primary   -follow-up with Dr. Sanchez 1 week   -ok for d/c per ENT    HPI  This patient is a 60 yo M with prior nasal injury requiring closed reduction and soft tissue repair 30 years ago presenting after dog bite to the nose. He reports it was his dog that he had for 1 year. He reports pain.     No other acute concerns.      CURRENT HOSPITAL MEDICATIONS  No current facility-administered medications for this encounter.     Current Outpatient Medications   Medication Sig Dispense Refill    doxycycline hyclate (VIBRAMYCIN) 100 mg capsule Take 1 capsule (100 mg total) by mouth 2 (two) times a day for 10 days 20 capsule 0    metroNIDAZOLE (FLAGYL) 500 mg tablet Take 1 tablet (500 mg total) by mouth every 8 (eight) hours for 10 days 30 tablet 0    B-D ULTRAFINE III SHORT PEN 31G X 8 MM MISC       Blood Glucose Monitoring Suppl (FreeStyle Lite) w/Device KIT       FreeStyle InsuLinx Test test strip       glucose blood test strip Use      HumaLOG KwikPen 200 units/mL CONCENTRATED U-200 injection pen Inject 8 Units under the skin      Insulin Glargine-yfgn 100 UNIT/ML SOPN Inject 35 Units under the skin      ketoconazole (NIZORAL) 2 % shampoo Daily for 2 weeks straight and then on \"Mondays, Wednesdays and Fridays\":  Lather into back; leave on for 5 minutes and then rinse off completely. (Patient not taking: Reported on 2/22/2024) 120 mL 6    Lancets (freestyle) lancets       metroNIDAZOLE (METROCREAM) 0.75 % cream Apply topically 2 (two) times a day 30 g 3       REVIEW OF SYSTEMS  As above    HISTORIES  PMH:  Past Medical History:   Diagnosis Date    Diabetes (HCC)        PSH:  History reviewed. No pertinent surgical " history.    SocHx:  Social History     Tobacco Use    Smoking status: Never    Smokeless tobacco: Never   Vaping Use    Vaping status: Never Used   Substance Use Topics    Alcohol use: No    Drug use: No       FH:  History reviewed. No pertinent family history.    ALLERGIES:  Allergies   Allergen Reactions    Bee Venom Anaphylaxis    Penicillins Rash       PHYSICAL EXAM  Visit Vitals  /75 (BP Location: Right arm)   Pulse 64   Temp 98.1 °F (36.7 °C) (Tympanic)   Resp 16   SpO2 97%   Smoking Status Never       General: NAD  Ears:  External ears normal in appearance  Nose:  laceration of columella right, right lower lateral cartilage laceration, laceration nasal tip, left columellar laceration, and left nasal side wall laceration with diffuse abrasions over the dorsum, septum intact   Oral cavity:  No trismus, no mass/lesions  Neck: Trachea is midline; no thyroid nodules, Salivary glands symmetrical, no masses/abnormality on palpation  Lymph:  No cervical lymphadenopathy  Skin:  No obvious facial lesions  Neuro: Motor and sensory grossly intact. Face symmetrical, no obvious cranial nerve palsies,motor and sensory grossly intact, no focal deficits.   Lungs:  Normal work of breathing, symmetrical chest expansion  Vascular: Well perfused      LABORATORY  Reviewed    PROCEDURES    After verbal consent was obtained from the patient the area was anesthetized using 1% lidocaine with 1:100,000 epinephrine. The area was thoroughly irrigated with saline and hydrogen peroxide.     After adequate time for anesthesia the right lower lateral cartilage was approximated with 5-0 PDS and the mucosa was approximated using 5-0 PDS.  The left alar laceration, left columellar laceration, nasal tip laceration and right columellar laceration was repaired in an inturrupted fashion with 5-0 prolene. Photos were uploaded to the chart.      The patient tolerated the procedure well without complications.     RADIOLOGY  None    There are no  problems to display for this patient.      Wandy Dickey, PGY3  Otolaryngology- Head and Neck Surgery  Please contact Secure Chat ENT resident role

## 2025-02-06 ENCOUNTER — ESTABLISHED COMPREHENSIVE EXAM (OUTPATIENT)
Dept: URBAN - METROPOLITAN AREA CLINIC 6 | Facility: CLINIC | Age: 60
End: 2025-02-06

## 2025-02-06 DIAGNOSIS — H25.13: ICD-10-CM

## 2025-02-06 DIAGNOSIS — E11.9: ICD-10-CM

## 2025-02-06 PROCEDURE — 92014 COMPRE OPH EXAM EST PT 1/>: CPT

## 2025-02-06 ASSESSMENT — VISUAL ACUITY
OD_SC: 20/40+1
OS_SC: 20/25

## 2025-02-06 ASSESSMENT — TONOMETRY
OD_IOP_MMHG: 21
OS_IOP_MMHG: 18